# Patient Record
Sex: MALE | Race: ASIAN | NOT HISPANIC OR LATINO
[De-identification: names, ages, dates, MRNs, and addresses within clinical notes are randomized per-mention and may not be internally consistent; named-entity substitution may affect disease eponyms.]

---

## 2021-01-04 ENCOUNTER — TRANSCRIPTION ENCOUNTER (OUTPATIENT)
Age: 46
End: 2021-01-04

## 2022-07-18 ENCOUNTER — EMERGENCY (EMERGENCY)
Facility: HOSPITAL | Age: 47
LOS: 0 days | Discharge: HOME | End: 2022-07-19
Attending: STUDENT IN AN ORGANIZED HEALTH CARE EDUCATION/TRAINING PROGRAM

## 2022-07-18 VITALS
HEART RATE: 76 BPM | WEIGHT: 164.91 LBS | HEIGHT: 65 IN | OXYGEN SATURATION: 98 % | RESPIRATION RATE: 16 BRPM | DIASTOLIC BLOOD PRESSURE: 103 MMHG | TEMPERATURE: 99 F | SYSTOLIC BLOOD PRESSURE: 143 MMHG

## 2022-07-18 DIAGNOSIS — R51.9 HEADACHE, UNSPECIFIED: ICD-10-CM

## 2022-07-18 DIAGNOSIS — E11.9 TYPE 2 DIABETES MELLITUS WITHOUT COMPLICATIONS: ICD-10-CM

## 2022-07-18 DIAGNOSIS — R07.89 OTHER CHEST PAIN: ICD-10-CM

## 2022-07-18 DIAGNOSIS — M79.10 MYALGIA, UNSPECIFIED SITE: ICD-10-CM

## 2022-07-18 DIAGNOSIS — R11.10 VOMITING, UNSPECIFIED: ICD-10-CM

## 2022-07-18 DIAGNOSIS — J45.909 UNSPECIFIED ASTHMA, UNCOMPLICATED: ICD-10-CM

## 2022-07-18 DIAGNOSIS — R07.9 CHEST PAIN, UNSPECIFIED: ICD-10-CM

## 2022-07-18 DIAGNOSIS — I10 ESSENTIAL (PRIMARY) HYPERTENSION: ICD-10-CM

## 2022-07-18 DIAGNOSIS — Z88.8 ALLERGY STATUS TO OTHER DRUGS, MEDICAMENTS AND BIOLOGICAL SUBSTANCES STATUS: ICD-10-CM

## 2022-07-18 DIAGNOSIS — Z20.822 CONTACT WITH AND (SUSPECTED) EXPOSURE TO COVID-19: ICD-10-CM

## 2022-07-18 LAB — GLUCOSE BLDC GLUCOMTR-MCNC: 99 MG/DL — SIGNIFICANT CHANGE UP (ref 70–99)

## 2022-07-18 PROCEDURE — 93010 ELECTROCARDIOGRAM REPORT: CPT

## 2022-07-18 PROCEDURE — 70450 CT HEAD/BRAIN W/O DYE: CPT | Mod: 26,MC

## 2022-07-18 PROCEDURE — 99285 EMERGENCY DEPT VISIT HI MDM: CPT

## 2022-07-18 RX ORDER — SODIUM CHLORIDE 9 MG/ML
1000 INJECTION INTRAMUSCULAR; INTRAVENOUS; SUBCUTANEOUS ONCE
Refills: 0 | Status: COMPLETED | OUTPATIENT
Start: 2022-07-18 | End: 2022-07-18

## 2022-07-18 RX ORDER — ONDANSETRON 8 MG/1
4 TABLET, FILM COATED ORAL ONCE
Refills: 0 | Status: COMPLETED | OUTPATIENT
Start: 2022-07-18 | End: 2022-07-18

## 2022-07-18 RX ADMIN — ONDANSETRON 4 MILLIGRAM(S): 8 TABLET, FILM COATED ORAL at 23:58

## 2022-07-18 RX ADMIN — SODIUM CHLORIDE 1000 MILLILITER(S): 9 INJECTION INTRAMUSCULAR; INTRAVENOUS; SUBCUTANEOUS at 22:58

## 2022-07-18 NOTE — ED PROVIDER NOTE - CARE PROVIDERS DIRECT ADDRESSES
[email or web address]

## 2022-07-18 NOTE — ED PROVIDER NOTE - PROGRESS NOTE DETAILS
W/u w/o significant abnormalities. On re-eval, resting comfortably, in NAD. Stable for d/c home. Given and recommend outpatient PCP, Cardio, Neuro f/u. Return signs / symptoms d/w pt. He understands / agrees w/ this plan.

## 2022-07-18 NOTE — ED PROVIDER NOTE - CLINICAL SUMMARY MEDICAL DECISION MAKING FREE TEXT BOX
46M w/PMHx of HTN, general h/a and x3 days CP, AF, VSS.  Plan: CT brain, CVC, CMP, trop, EKG, X-ray, flue COVID, Zofran and re-eval. 46M w/ PMH HTN, DM pw generalized h/a x 6 days and L sided CP x 3 days. AF, VSS. Well appearing, in NAD. Plan: CT brain, CBC, CMP, ECG, CXR, Trop, Flu/COVID swab. Give IVF, Zofran. Re-eval.

## 2022-07-18 NOTE — ED ADULT NURSE NOTE - OBJECTIVE STATEMENT
PT C/O headache, nausea, bodyaches ( r hand joint paint), and L cp ( constant, squeezing) x 6 days. htn, dm. pt sent by , for ctscan, also c/o insomnia

## 2022-07-18 NOTE — ED PROVIDER NOTE - PATIENT PORTAL LINK FT
You can access the FollowMyHealth Patient Portal offered by [facility] by registering at the following website: [email or web address]. By joining [employer]’s FollowMyHealth portal, you will also be able to view your health information using other applications (apps) compatible with our system.

## 2022-07-18 NOTE — ED PROVIDER NOTE - OBJECTIVE STATEMENT
47 y/o M w/PMHx of DM and HTN presents to the ED for h/a and x3 days L sided CP. Pt states that he did tons of roller coaster rides on last Wednesday and started vomiting x1 and having constant h/a. Pt states that he has tried Tylenol w/o relief.  Pt reports that he started having constant "squeezing like" CP after x3 days on Friday. Pt also reports that he is having body aches today. Pt went to  and they recommended him to come to the  ED for CT scan of head and X-ray. Pt denies SOB, belly pain or any other symptoms. 46M w/ PMH HTN, DM pw generalized h/a x 6 days s/p riding roller coasters and other rides at [employer] 6 days ago w/ family. Pt reports w/ vomiting that 1st day, but none since. Pt taking Tylenol w/o relief. Pt went to  this AM, referred to ED for CT imaging. Pt also w/ L sided chest pain x 2-3 days, describes as squeezing pain, constant, non radiating. Pt endorses generalized body aches x 1 day. Denies F/C, SOB, cough, abd pain, diarrhea, UTI sx, LE pain / swelling.     PMH as above, PSH non contributory, NKDA, meds as listed.

## 2022-07-18 NOTE — ED PROVIDER NOTE - CARE PROVIDER_API CALL
Noman Meyers)  Clinical Neurophysiology; Neurology  [address], [address]  [place] [address]  Phone: [phone]  Fax: [phone]  Follow Up Time: 7-10 Days    Jose Yoder)  Medicine  Cardiology  [address] [place] [id number]  Phone: [phone]  Fax: [phone]  Follow Up Time: 7-10 Days

## 2022-07-18 NOTE — ED PROVIDER NOTE - PROVIDER TOKENS
PROVIDER:[TOKEN:[[id number]],FOLLOWUP:[7-10 Days]],PROVIDER:[TOKEN:[[id number]],FOLLOWUP:[7-10 Days]]

## 2022-07-18 NOTE — ED ADULT TRIAGE NOTE - CHIEF COMPLAINT QUOTE
pt presents to the ED c/o headache, vomiting and body pain started after went on rollercoaster last Wednesday and last Friday started to get intermittent left side cp.  pt seen and sent by urgent care, for further eval, ct scan of head   hx dm, htn (no longer taking meds as per pcp)

## 2022-07-19 VITALS
DIASTOLIC BLOOD PRESSURE: 83 MMHG | SYSTOLIC BLOOD PRESSURE: 120 MMHG | RESPIRATION RATE: 16 BRPM | TEMPERATURE: 98 F | OXYGEN SATURATION: 99 % | HEART RATE: 67 BPM

## 2022-07-19 LAB
ALBUMIN SERPL ELPH-MCNC: 3.6 G/DL — SIGNIFICANT CHANGE UP (ref 3.3–5)
ALP SERPL-CCNC: 76 U/L — SIGNIFICANT CHANGE UP (ref 40–120)
ALT FLD-CCNC: 29 U/L — SIGNIFICANT CHANGE UP (ref 12–78)
ANION GAP SERPL CALC-SCNC: 10 MMOL/L — SIGNIFICANT CHANGE UP (ref 5–17)
AST SERPL-CCNC: 18 U/L — SIGNIFICANT CHANGE UP (ref 15–37)
BASOPHILS # BLD AUTO: 0.02 K/UL — SIGNIFICANT CHANGE UP (ref 0–0.2)
BASOPHILS NFR BLD AUTO: 0.2 % — SIGNIFICANT CHANGE UP (ref 0–2)
BILIRUB SERPL-MCNC: 0.3 MG/DL — SIGNIFICANT CHANGE UP (ref 0.2–1.2)
BUN SERPL-MCNC: 15 MG/DL — SIGNIFICANT CHANGE UP (ref 7–23)
CALCIUM SERPL-MCNC: 9 MG/DL — SIGNIFICANT CHANGE UP (ref 8.5–10.1)
CHLORIDE SERPL-SCNC: 103 MMOL/L — SIGNIFICANT CHANGE UP (ref 96–108)
CO2 SERPL-SCNC: 27 MMOL/L — SIGNIFICANT CHANGE UP (ref 22–31)
CREAT SERPL-MCNC: 0.99 MG/DL — SIGNIFICANT CHANGE UP (ref 0.5–1.3)
EGFR: 94 ML/MIN/1.73M2 — SIGNIFICANT CHANGE UP
EOSINOPHIL # BLD AUTO: 0.68 K/UL — HIGH (ref 0–0.5)
EOSINOPHIL NFR BLD AUTO: 6.1 % — HIGH (ref 0–6)
FLUAV AG NPH QL: SIGNIFICANT CHANGE UP
FLUBV AG NPH QL: SIGNIFICANT CHANGE UP
GLUCOSE SERPL-MCNC: 101 MG/DL — HIGH (ref 70–99)
HCT VFR BLD CALC: 43.5 % — SIGNIFICANT CHANGE UP (ref 39–50)
HGB BLD-MCNC: 14.9 G/DL — SIGNIFICANT CHANGE UP (ref 13–17)
IMM GRANULOCYTES NFR BLD AUTO: 0.2 % — SIGNIFICANT CHANGE UP (ref 0–1.5)
LYMPHOCYTES # BLD AUTO: 36.5 % — SIGNIFICANT CHANGE UP (ref 13–44)
LYMPHOCYTES # BLD AUTO: 4.05 K/UL — HIGH (ref 1–3.3)
MCHC RBC-ENTMCNC: 28.9 PG — SIGNIFICANT CHANGE UP (ref 27–34)
MCHC RBC-ENTMCNC: 34.3 G/DL — SIGNIFICANT CHANGE UP (ref 32–36)
MCV RBC AUTO: 84.3 FL — SIGNIFICANT CHANGE UP (ref 80–100)
MONOCYTES # BLD AUTO: 0.95 K/UL — HIGH (ref 0–0.9)
MONOCYTES NFR BLD AUTO: 8.6 % — SIGNIFICANT CHANGE UP (ref 2–14)
NEUTROPHILS # BLD AUTO: 5.39 K/UL — SIGNIFICANT CHANGE UP (ref 1.8–7.4)
NEUTROPHILS NFR BLD AUTO: 48.4 % — SIGNIFICANT CHANGE UP (ref 43–77)
NRBC # BLD: 0 /100 WBCS — SIGNIFICANT CHANGE UP (ref 0–0)
PLATELET # BLD AUTO: 267 K/UL — SIGNIFICANT CHANGE UP (ref 150–400)
POTASSIUM SERPL-MCNC: 3.6 MMOL/L — SIGNIFICANT CHANGE UP (ref 3.5–5.3)
POTASSIUM SERPL-SCNC: 3.6 MMOL/L — SIGNIFICANT CHANGE UP (ref 3.5–5.3)
PROT SERPL-MCNC: 7.7 GM/DL — SIGNIFICANT CHANGE UP (ref 6–8.3)
RBC # BLD: 5.16 M/UL — SIGNIFICANT CHANGE UP (ref 4.2–5.8)
RBC # FLD: 13.1 % — SIGNIFICANT CHANGE UP (ref 10.3–14.5)
SARS-COV-2 RNA SPEC QL NAA+PROBE: SIGNIFICANT CHANGE UP
SODIUM SERPL-SCNC: 140 MMOL/L — SIGNIFICANT CHANGE UP (ref 135–145)
TROPONIN I, HIGH SENSITIVITY RESULT: <3 NG/L — SIGNIFICANT CHANGE UP
WBC # BLD: 11.11 K/UL — HIGH (ref 3.8–10.5)
WBC # FLD AUTO: 11.11 K/UL — HIGH (ref 3.8–10.5)

## 2022-07-19 PROCEDURE — 71045 X-RAY EXAM CHEST 1 VIEW: CPT | Mod: 26

## 2023-03-01 PROBLEM — E11.9 TYPE 2 DIABETES MELLITUS WITHOUT COMPLICATIONS: Chronic | Status: ACTIVE | Noted: 2022-07-19

## 2023-03-01 PROBLEM — J45.909 UNSPECIFIED ASTHMA, UNCOMPLICATED: Chronic | Status: ACTIVE | Noted: 2022-07-19

## 2023-03-14 ENCOUNTER — APPOINTMENT (OUTPATIENT)
Dept: UROLOGY | Facility: CLINIC | Age: 48
End: 2023-03-14
Payer: MEDICAID

## 2023-03-14 DIAGNOSIS — F52.4 PREMATURE EJACULATION: ICD-10-CM

## 2023-03-14 PROBLEM — Z00.00 ENCOUNTER FOR PREVENTIVE HEALTH EXAMINATION: Status: ACTIVE | Noted: 2023-03-14

## 2023-03-14 PROCEDURE — 99204 OFFICE O/P NEW MOD 45 MIN: CPT

## 2023-03-14 NOTE — HISTORY OF PRESENT ILLNESS
[FreeTextEntry1] : cc blood in urine \par 46 yo male referred for eval for blood in urine \par no voiding complaints \par nonsmoker \par no flank pain or h/o stones - brother recent renal colic\par no fam h/o gu ca\par 10-20 rbc\par \par erections good but c/o PE\par tried topical cream in past

## 2023-04-05 ENCOUNTER — APPOINTMENT (OUTPATIENT)
Dept: UROLOGY | Facility: CLINIC | Age: 48
End: 2023-04-05
Payer: MEDICAID

## 2023-04-05 VITALS
SYSTOLIC BLOOD PRESSURE: 108 MMHG | TEMPERATURE: 98 F | WEIGHT: 166 LBS | DIASTOLIC BLOOD PRESSURE: 74 MMHG | HEART RATE: 75 BPM | OXYGEN SATURATION: 97 %

## 2023-04-05 DIAGNOSIS — R31.29 OTHER MICROSCOPIC HEMATURIA: ICD-10-CM

## 2023-04-05 PROCEDURE — 52000 CYSTOURETHROSCOPY: CPT

## 2023-04-07 ENCOUNTER — RX RENEWAL (OUTPATIENT)
Age: 48
End: 2023-04-07

## 2023-04-07 RX ORDER — PAROXETINE HYDROCHLORIDE 20 MG/1
20 TABLET, FILM COATED ORAL
Qty: 30 | Refills: 0 | Status: ACTIVE | COMMUNITY
Start: 2023-03-14 | End: 1900-01-01

## 2023-04-10 PROBLEM — R31.29 HEMATURIA, MICROSCOPIC: Status: ACTIVE | Noted: 2023-03-14

## 2024-05-25 ENCOUNTER — EMERGENCY (EMERGENCY)
Facility: HOSPITAL | Age: 49
LOS: 1 days | Discharge: HOME | End: 2024-05-25
Attending: EMERGENCY MEDICINE | Admitting: EMERGENCY MEDICINE
Payer: MEDICAID

## 2024-05-25 VITALS
DIASTOLIC BLOOD PRESSURE: 97 MMHG | SYSTOLIC BLOOD PRESSURE: 148 MMHG | RESPIRATION RATE: 20 BRPM | HEART RATE: 84 BPM | TEMPERATURE: 98 F | WEIGHT: 164.02 LBS | OXYGEN SATURATION: 97 %

## 2024-05-25 LAB
A1C WITH ESTIMATED AVERAGE GLUCOSE RESULT: 6.3 % — HIGH (ref 4–5.6)
ALBUMIN SERPL ELPH-MCNC: 3.9 G/DL — SIGNIFICANT CHANGE UP (ref 3.3–5)
ALP SERPL-CCNC: 85 U/L — SIGNIFICANT CHANGE UP (ref 40–120)
ALT FLD-CCNC: 21 U/L — SIGNIFICANT CHANGE UP (ref 4–41)
ANION GAP SERPL CALC-SCNC: 13 MMOL/L — SIGNIFICANT CHANGE UP (ref 7–14)
AST SERPL-CCNC: 17 U/L — SIGNIFICANT CHANGE UP (ref 4–40)
BASOPHILS # BLD AUTO: 0.02 K/UL — SIGNIFICANT CHANGE UP (ref 0–0.2)
BASOPHILS NFR BLD AUTO: 0.2 % — SIGNIFICANT CHANGE UP (ref 0–2)
BILIRUB SERPL-MCNC: 0.3 MG/DL — SIGNIFICANT CHANGE UP (ref 0.2–1.2)
BUN SERPL-MCNC: 18 MG/DL — SIGNIFICANT CHANGE UP (ref 7–23)
CALCIUM SERPL-MCNC: 8.6 MG/DL — SIGNIFICANT CHANGE UP (ref 8.4–10.5)
CHLORIDE SERPL-SCNC: 104 MMOL/L — SIGNIFICANT CHANGE UP (ref 98–107)
CO2 SERPL-SCNC: 20 MMOL/L — LOW (ref 22–31)
CREAT SERPL-MCNC: 1.04 MG/DL — SIGNIFICANT CHANGE UP (ref 0.5–1.3)
D DIMER BLD IA.RAPID-MCNC: 196 NG/ML DDU — SIGNIFICANT CHANGE UP
EGFR: 87 ML/MIN/1.73M2 — SIGNIFICANT CHANGE UP
EOSINOPHIL # BLD AUTO: 1.12 K/UL — HIGH (ref 0–0.5)
EOSINOPHIL NFR BLD AUTO: 13.5 % — HIGH (ref 0–6)
ESTIMATED AVERAGE GLUCOSE: 134 — SIGNIFICANT CHANGE UP
GLUCOSE SERPL-MCNC: 143 MG/DL — HIGH (ref 70–99)
HCT VFR BLD CALC: 43.1 % — SIGNIFICANT CHANGE UP (ref 39–50)
HGB BLD-MCNC: 15.1 G/DL — SIGNIFICANT CHANGE UP (ref 13–17)
IANC: 4.02 K/UL — SIGNIFICANT CHANGE UP (ref 1.8–7.4)
IMM GRANULOCYTES NFR BLD AUTO: 0.2 % — SIGNIFICANT CHANGE UP (ref 0–0.9)
LYMPHOCYTES # BLD AUTO: 2.49 K/UL — SIGNIFICANT CHANGE UP (ref 1–3.3)
LYMPHOCYTES # BLD AUTO: 30 % — SIGNIFICANT CHANGE UP (ref 13–44)
MCHC RBC-ENTMCNC: 29.2 PG — SIGNIFICANT CHANGE UP (ref 27–34)
MCHC RBC-ENTMCNC: 35 GM/DL — SIGNIFICANT CHANGE UP (ref 32–36)
MCV RBC AUTO: 83.2 FL — SIGNIFICANT CHANGE UP (ref 80–100)
MONOCYTES # BLD AUTO: 0.64 K/UL — SIGNIFICANT CHANGE UP (ref 0–0.9)
MONOCYTES NFR BLD AUTO: 7.7 % — SIGNIFICANT CHANGE UP (ref 2–14)
NEUTROPHILS # BLD AUTO: 4.02 K/UL — SIGNIFICANT CHANGE UP (ref 1.8–7.4)
NEUTROPHILS NFR BLD AUTO: 48.4 % — SIGNIFICANT CHANGE UP (ref 43–77)
NRBC # BLD: 0 /100 WBCS — SIGNIFICANT CHANGE UP (ref 0–0)
NRBC # FLD: 0 K/UL — SIGNIFICANT CHANGE UP (ref 0–0)
PLATELET # BLD AUTO: 224 K/UL — SIGNIFICANT CHANGE UP (ref 150–400)
POTASSIUM SERPL-MCNC: 4.2 MMOL/L — SIGNIFICANT CHANGE UP (ref 3.5–5.3)
POTASSIUM SERPL-SCNC: 4.2 MMOL/L — SIGNIFICANT CHANGE UP (ref 3.5–5.3)
PROT SERPL-MCNC: 6.9 G/DL — SIGNIFICANT CHANGE UP (ref 6–8.3)
RBC # BLD: 5.18 M/UL — SIGNIFICANT CHANGE UP (ref 4.2–5.8)
RBC # FLD: 12.6 % — SIGNIFICANT CHANGE UP (ref 10.3–14.5)
SODIUM SERPL-SCNC: 137 MMOL/L — SIGNIFICANT CHANGE UP (ref 135–145)
TROPONIN T, HIGH SENSITIVITY RESULT: <6 NG/L — SIGNIFICANT CHANGE UP
TROPONIN T, HIGH SENSITIVITY RESULT: <6 NG/L — SIGNIFICANT CHANGE UP
WBC # BLD: 8.31 K/UL — SIGNIFICANT CHANGE UP (ref 3.8–10.5)
WBC # FLD AUTO: 8.31 K/UL — SIGNIFICANT CHANGE UP (ref 3.8–10.5)

## 2024-05-25 PROCEDURE — 99223 1ST HOSP IP/OBS HIGH 75: CPT

## 2024-05-25 PROCEDURE — 71046 X-RAY EXAM CHEST 2 VIEWS: CPT | Mod: 26

## 2024-05-25 PROCEDURE — 93010 ELECTROCARDIOGRAM REPORT: CPT

## 2024-05-25 RX ORDER — ACETAMINOPHEN 500 MG
1000 TABLET ORAL ONCE
Refills: 0 | Status: COMPLETED | OUTPATIENT
Start: 2024-05-25 | End: 2024-05-25

## 2024-05-25 RX ORDER — KETOROLAC TROMETHAMINE 30 MG/ML
15 SYRINGE (ML) INJECTION ONCE
Refills: 0 | Status: DISCONTINUED | OUTPATIENT
Start: 2024-05-25 | End: 2024-05-25

## 2024-05-25 RX ADMIN — Medication 400 MILLIGRAM(S): at 09:55

## 2024-05-25 RX ADMIN — Medication 15 MILLIGRAM(S): at 14:42

## 2024-05-25 RX ADMIN — Medication 15 MILLIGRAM(S): at 14:56

## 2024-05-25 NOTE — ED CDU PROVIDER INITIAL DAY NOTE - WR ORDER NAME 1
See message below. Patient does not want to wait until 2022 to get next colonoscopy due to them finding polyps every time he goes and due to family history. Please advise if patient should have colonoscopy sooner than 2022.   Routing to MD. Xray Chest 2 Views PA/Lat

## 2024-05-25 NOTE — ED ADULT NURSE REASSESSMENT NOTE - NS ED NURSE REASSESS COMMENT FT1
Pt to be placed in CDU for observation. Report given to CDU RN Liv. Patient remains at baseline mental status. Appears to be resting comfortably in stretcher, breathing even and unlabored on room air. NAD noted at this time. Safety maintained

## 2024-05-25 NOTE — ED ADULT NURSE NOTE - OBJECTIVE STATEMENT
Patient arrives to room #16, AOx4, ambulatory. Hx of HTN, DMT2. c/o non-radiating 7/10 left sided chest pain, onset 2 hours ago. Pt states he was dropping his kids off at school when pain started. Pt received 324 Aspirin w/ EMS. Denies dizziness, HA, palpitations, SOB, vision changes, n/v/d, constipation, abdominal pain, fever, chills, cough, dizziness, numbness, tingling, dysuria. Denies sick contacts. Breathing even and unlabored on room air, no signs of dyspnea or respiratory distress. Neuro status intact. Skin warm, dry, intact, appropriate for race, No signs of cyanosis/pallor noted. Pulses b/l equal, cap refill <2 sec. EKG in chart. Placed on cardiac monitor, NSR on cardiac monitor. 20G IV placed in right ac. Labs drawn and sent. Vital signs as charted. Safety maintained, stretcher locked in lowest position with siderails up x2, call bell and personal items within reach. Medication given and tolerated well per EMAR. Patient pending chest xray.

## 2024-05-25 NOTE — ED CDU PROVIDER INITIAL DAY NOTE - OBJECTIVE STATEMENT
49 yo M with PMH HTN, DM, chest pain, presenting to ED for evaluation of chest pain x 2 hours. Pain started abruptly when he was dropping his kids off, and increased in intensity over 15-20 minutes. Chest pain described as squeezing in the left side chest below nipple line, worse with sitting up. Patients wife called ambulance for transport to ED. Patient was given 324mg of aspirin in transport by EMS. Reports has not seen cardiologist in 5+ years, has not seen pcp in 2-3 years, and has stopped home medications for HTN and DM 2-3 years prior. Denies pain radiating to arms, jaw or back pain, fevers, chills, HA, SOB, abdominal pain, n/v/d/c, numbness or weakness in the extremities, calf pain, dysuria, hematuria. Denies recent new medications or changes in daily habits. Denies recent travel or sick contacts.    CDU PA- agree with above, sent to CDU for tele monitoring and stress test

## 2024-05-25 NOTE — ED PROVIDER NOTE - ATTENDING APP SHARED VISIT CONTRIBUTION OF CARE
48M p/w chest pain.  EKG SR at 82 no demond no std no twi   qtc 429.  H/o DM and HTN; has had CP in the past but hasn't seen anyone in 5 years.  Stopped meds 2 years ago.  Having Cp x 1 day, Rad to L axilla.  Yest USOH. No sweating or vomiting or LOC.  Got ASA by EMS.  Plan check labs, trops, CXR.  Check dimer as pain is somewhat pleuritic rule out PE.  Place in CDU for stress test.    VS:  unremarkable    GEN - NAD;  malaise;   A+O x3   HEAD - NC/AT     ENT - PEERL, EOMI, mucous membranes    moist , no discharge      NECK: Neck supple, non-tender without lymphadenopathy, no masses, no JVD  PULM - CTA b/l,  symmetric breath sounds  COR -  normal heart sounds    ABD - , ND, NT, soft,  BACK - no CVA tenderness, nontender spine     EXTREMS - no edema, no deformity, warm and well perfused    SKIN - no rash    or bruising      NEUROLOGIC - alert, face symmetric, speech fluent, sensation nl, motor no focal deficit.

## 2024-05-25 NOTE — ED PROVIDER NOTE - CLINICAL SUMMARY MEDICAL DECISION MAKING FREE TEXT BOX
47 yo M with PMH HTN, DM, chest pain, presenting to ED for evaluation of chest pain x 2 hours. Pain started abruptly, and increased in intensity over 15-20 minutes. Chest pain described as squeezing in the left side chest below nipple line, worse with sitting up.  Patients wife called ambulance for transport to ED. Patient was given 324mg of aspirin in transport by EMS. Reports has not seen cardiologist in 5+ years, has not seen pcp in 2-3 years, and has stopped home medications for HTN and DM 2-3 years prior. Denies pain radiating to arms, jaw or back pain, fevers, chills, HA, SOB, abdominal pain, n/v/d/c, numbness or weakness in the extremities, dysuria, hematuria. Denies recent new medications or changes in daily habits. Denies recent travel or sick contacts.    On exam, appears in NAD, speaking in clear and coherent sentences, A&Ox3. Breathing unlabored. Lungs clear and equal breath sounds bilaterally, heart RRR, abdomen soft and non tender to palpation. Mild left chest wall tenderness to palpation, with abdominal crunch, and with large deep breath.   EKG: NSR.   Vitals: /97, HR 84, afebrile at 98F, O2 97% on RA.  Main differentials include but are not limited to: acute cardiac pathology including MI, pulmonary embolism, musculoskeletal pain including costochondritis, versus less likely epigastric pain.     Will order: IV start, monitoring, Labs: CBC + diff, CMP, Trop T, A1C, d-dimer, CXR, ofirmev. Patient given aspirin in transport.     See progress notes for ongoing care. 47 yo M with PMH HTN, DM, chest pain, presenting to ED for evaluation of chest pain x 2 hours. Pain started abruptly, and increased in intensity over 15-20 minutes. Chest pain described as squeezing in the left side chest below nipple line, worse with sitting up.  Patients wife called ambulance for transport to ED. Patient was given 324mg of aspirin in transport by EMS. Reports has not seen cardiologist in 5+ years, has not seen pcp in 2-3 years, and has stopped home medications for HTN and DM 2-3 years prior. Denies pain radiating to arms, jaw or back pain, calf pain, fevers, chills, HA, SOB, abdominal pain, n/v/d/c, numbness or weakness in the extremities, dysuria, hematuria. Denies recent new medications or changes in daily habits. Denies recent travel or sick contacts.    On exam, appears in NAD, speaking in clear and coherent sentences, A&Ox3. Breathing unlabored. Lungs clear and equal breath sounds bilaterally, heart RRR, abdomen soft and non tender to palpation. Mild left chest wall tenderness to palpation, with abdominal crunch, and with large deep breath.   EKG: NSR.   Vitals: /97, HR 84, afebrile at 98F, O2 97% on RA.  Main differentials include but are not limited to: acute cardiac pathology including MI, pulmonary embolism, musculoskeletal pain including costochondritis, versus less likely epigastric pain.     Will order: IV start, monitoring, Labs: CBC + diff, CMP, Trop T, A1C, d-dimer, CXR, ofirmev. Patient given aspirin in transport.     See progress notes for ongoing care.

## 2024-05-25 NOTE — ED ADULT NURSE NOTE - NSFALLUNIVINTERV_ED_ALL_ED
Bed/Stretcher in lowest position, wheels locked, appropriate side rails in place/Call bell, personal items and telephone in reach/Instruct patient to call for assistance before getting out of bed/chair/stretcher/Non-slip footwear applied when patient is off stretcher/[place] to call system/Physically safe environment - no spills, clutter or unnecessary equipment/Purposeful proactive rounding/Room/bathroom lighting operational, light cord in reach

## 2024-05-25 NOTE — ED PROVIDER NOTE - PHYSICAL EXAMINATION
CONSTITUTIONAL: Well appearing, awake, alert, oriented to person, place, time/situation.  HEAD: Atraumatic, normocephalic  NECK: Supple, no tender lymphadenopathy.  EYES: Clear bilaterally, pupils equal, round and reactive to light.  ENMT: Airway patent, Nasal mucosa clear. Mouth with normal mucosa. Uvula is midline.   CARDIAC: Regular rate, regular rhythm.  RESPIRATORY: Breathing unlabored. Breath sounds clear and equal bilaterally.  ABDOMEN:  + chest wall tenderness left side to palpation and elicited with abdominal crunch. Abdomen soft, nontender, nondistended. No rebound tenderness or guarding.  FLANK: No CVA tenderness bilaterally.  NEUROLOGICAL: Alert and oriented, no focal deficits, no motor or sensory deficits.  MSK: No clubbing, cyanosis, or edema.  SKIN: Skin warm and dry

## 2024-05-25 NOTE — ED ADULT TRIAGE NOTE - CHIEF COMPLAINT QUOTE
Patient complains of generalized chest pain that started 2 hours ago. Patient denies pain radiating to arms, denies any numbness or tingling in arms. Patient received 324mg of Aspirin as per EMS. Patient denies any SOB, respirations equal and unlabored on room air. Patient holding onto chest during assessment, appears uncomfortable. Fingerstick 175. pmhx: HTN, DM

## 2024-05-25 NOTE — ED PROVIDER NOTE - PROGRESS NOTE DETAILS
REANNA Bolton:   Patient seen bedside, appears in NAD. Reports left sided chest wall pain has remained consistent. Patient stable, A&Ox3. Vitals stable with /85, afebrile at 97.9F, HR 79, O2 98% on RA. Continues to deny pain radiating to arms, jaw or back pain, HA, SOB, abdominal pain, n/v/d/c, numbness or weakness in the extremities, calf pain. Reviewed labs and testing with patient. Trop negative, pending result of repeat confirmatory trop, EKG NSR, CXR normal, d-dimer negative, A1c elevated at 6.3% with blood glucose 163 and known hx of DM.   Discussed management options with patient; observation with further testing including stress test versus discharge to home with out patient follow up. Secondary to patients report of insidious onset of left sided chest pain with negative workup in ED, stress test and observation recommended. Patient agreeable to stay for observation and further testing. Will call CDU to arrange.

## 2024-05-25 NOTE — ED PROVIDER NOTE - OBJECTIVE STATEMENT
49 yo M with PMH HTN, DM, chest pain, presenting to ED for evaluation of chest pain x 2 hours. Pain started abruptly when he was dropping his kids off, and increased in intensity over 15-20 minutes. Chest pain described as squeezing in the left side chest below nipple line, worse with sitting up. Patients wife called ambulance for transport to ED. Patient was given 324mg of aspirin in transport by EMS. Reports has not seen cardiologist in 5+ years, has not seen pcp in 2-3 years, and has stopped home medications for HTN and DM 2-3 years prior. Denies pain radiating to arms, jaw or back pain, fevers, chills, HA, SOB, abdominal pain, n/v/d/c, numbness or weakness in the extremities, dysuria, hematuria. Denies recent new medications or changes in daily habits. Denies recent travel or sick contacts. 47 yo M with PMH HTN, DM, chest pain, presenting to ED for evaluation of chest pain x 2 hours. Pain started abruptly when he was dropping his kids off, and increased in intensity over 15-20 minutes. Chest pain described as squeezing in the left side chest below nipple line, worse with sitting up. Patients wife called ambulance for transport to ED. Patient was given 324mg of aspirin in transport by EMS. Reports has not seen cardiologist in 5+ years, has not seen pcp in 2-3 years, and has stopped home medications for HTN and DM 2-3 years prior. Denies pain radiating to arms, jaw or back pain, fevers, chills, HA, SOB, abdominal pain, n/v/d/c, numbness or weakness in the extremities, calf pain, dysuria, hematuria. Denies recent new medications or changes in daily habits. Denies recent travel or sick contacts.

## 2024-05-26 ENCOUNTER — RESULT REVIEW (OUTPATIENT)
Age: 49
End: 2024-05-26

## 2024-05-26 VITALS
SYSTOLIC BLOOD PRESSURE: 122 MMHG | OXYGEN SATURATION: 97 % | RESPIRATION RATE: 18 BRPM | HEART RATE: 91 BPM | TEMPERATURE: 98 F | DIASTOLIC BLOOD PRESSURE: 83 MMHG

## 2024-05-26 PROCEDURE — 93018 CV STRESS TEST I&R ONLY: CPT | Mod: GC,MC

## 2024-05-26 PROCEDURE — 99239 HOSP IP/OBS DSCHRG MGMT >30: CPT

## 2024-05-26 PROCEDURE — 93016 CV STRESS TEST SUPVJ ONLY: CPT | Mod: GC,MC

## 2024-05-26 NOTE — ED CDU PROVIDER DISPOSITION NOTE - CLINICAL COURSE
47 y/o male pmh htn, dm no longer on meds c/o chest pain. Pt had normal labs and EKG and was sent to the CDU for tele and stress. Pt did well overnight with no acute events. Stress was negative for acute pathology. Pt cleared for dc.

## 2024-05-26 NOTE — ED CDU PROVIDER DISPOSITION NOTE - ATTENDING APP SHARED VISIT CONTRIBUTION OF CARE
Lubna Nuñez MD attending physician patient 48-year-old gentleman who comes to the CDU because of chest pain chest pain occurred while he was dropping kids off at school.  Also has a history of hypertension diabetes.    A1c was evaluated 6.3.  Troponins were negative x 2.  EKG was nonischemic.    Patient here with blood pressure 124 respiratory rate 16 heart rate 70 afebrile O2 sat is normal    Patient awake alert able to communicate.  Went to go to his stress test already and has returned. stress neg, over 70%ef.  pt can follow up with pmd      I performed a history and physical exam of the patient and discussed their management with the resident and /or advanced care provider. I personally made/approved the management plan and take responsibility for the patient management. I reviewed the resident and /or ACP's note and agree with the documented findings and plan of care. My medical decison making and observations are found above.

## 2024-05-26 NOTE — ED CDU PROVIDER SUBSEQUENT DAY NOTE - HISTORY
49 yo M with PMH HTN, DM,  presented to ED for evaluation of chest pain, Pain started abruptly when he was dropping his kids off, and increased in intensity over 15-20 minutes. Chest pain described as squeezing in the left side chest below nipple line, worse with sitting up and positional changes. In main ED EKG NSR non ischemic, trop <6x2, d-dimer negative, CXR with clear lungs. Sent to [facility] tele monitoring and stress test.   In interim pt is resting comfortably, vitals stable, no events on tele thus far

## 2024-05-26 NOTE — ED CDU PROVIDER SUBSEQUENT DAY NOTE - CLINICAL SUMMARY MEDICAL DECISION MAKING FREE TEXT BOX
49 yo M with PMH HTN, DM,  presented to ED for evaluation of chest pain, Pain started abruptly when he was dropping his kids off, and increased in intensity over 15-20 minutes. Chest pain described as squeezing in the left side chest below nipple line, worse with sitting up and positional changes. In main ED EKG NSR non ischemic, trop <6x2, d-dimer negative, CXR with clear lungs. Sent to [facility] tele monitoring and stress test.

## 2024-05-26 NOTE — ED CDU PROVIDER DISPOSITION NOTE - PATIENT PORTAL LINK FT
You can access the FollowMyHealth Patient Portal offered by [facility] by registering at the following website: [email or web address]. By joining [employer]’s FollowMyHealth portal, you will also be able to view your health information using other applications (apps) compatible with our system.

## 2024-05-26 NOTE — ED CDU PROVIDER SUBSEQUENT DAY NOTE - ATTENDING APP SHARED VISIT CONTRIBUTION OF CARE
Lubna Nuñez MD attending physician patient 48-year-old gentleman who comes to the CDU because of chest pain chest pain occurred while he was dropping kids off at school.  Also has a history of hypertension diabetes.    A1c was evaluated 6.3.  Troponins were negative x 2.  EKG was nonischemic.    Patient here with blood pressure 124 respiratory rate 16 heart rate 70 afebrile O2 sat is normal    Patient awake alert able to communicate.  Went to go to his stress test already and has returned.  Await cardiology input and further evaluation for him for a plan.      I performed a history and physical exam of the patient and discussed their management with the resident and /or advanced care provider. I personally made/approved the management plan and take responsibility for the patient management. I reviewed the resident and /or ACP's note and agree with the documented findings and plan of care. My medical decison making and observations are found above. Lubna Nuñez MD attending physician patient 48-year-old gentleman who comes to the CDU because of chest pain chest pain occurred while he was dropping kids off at school.  Also has a history of hypertension diabetes.    A1c was evaluated 6.3.  Troponins were negative x 2.  EKG was nonischemic.    Patient here with blood pressure 124 respiratory rate 16 heart rate 70 afebrile O2 sat is normal    Patient awake alert able to communicate.  Went to go to his stress test already and has returned.  Await results of stress and further evaluation for him for a plan.      I performed a history and physical exam of the patient and discussed their management with the resident and /or advanced care provider. I personally made/approved the management plan and take responsibility for the patient management. I reviewed the resident and /or ACP's note and agree with the documented findings and plan of care. My medical decison making and observations are found above.

## 2025-03-31 NOTE — ED ADULT TRIAGE NOTE - PATIENT ON (OXYGEN DELIVERY METHOD)
Patient dropped off BP readings:    3/21: 196/96, 173/91, 187/86  3/22: 187/76, 191/97, 189/87  3/23: 144/75, 160/84  3/24: 180/88, 160/79  3/25: 180/88, 160/79, 157/85  3/26: 163/85, 159/80, 164/85  3/27: 183/90, 178/91  3/28: 182/89, 151/76  3/29: 149/89, 156/86, 175/88  3/30: 172/89, 176/91, 169/90    LOV with Dr Hernandez 3/19: Her current medication regimen includes amlodipine 5 mg, hydrochlorothiazide 25 mg, and Coreg 40 mg. - Consider increasing amlodipine dosage to 10 mg if blood pressure remains elevated     To Dr. Hernandez    room air